# Patient Record
Sex: MALE | Race: WHITE | NOT HISPANIC OR LATINO | Employment: OTHER | URBAN - METROPOLITAN AREA
[De-identification: names, ages, dates, MRNs, and addresses within clinical notes are randomized per-mention and may not be internally consistent; named-entity substitution may affect disease eponyms.]

---

## 2019-04-29 ENCOUNTER — PREPPED CHART (OUTPATIENT)
Dept: URBAN - METROPOLITAN AREA CLINIC 32 | Facility: CLINIC | Age: 84
End: 2019-04-29

## 2020-01-13 ENCOUNTER — ESTABLISHED PATIENT (OUTPATIENT)
Dept: URBAN - METROPOLITAN AREA CLINIC 32 | Facility: CLINIC | Age: 85
End: 2020-01-13

## 2020-01-13 DIAGNOSIS — H35.3131: ICD-10-CM

## 2020-01-13 DIAGNOSIS — H35.373: ICD-10-CM

## 2020-01-13 PROCEDURE — 92134 CPTRZ OPH DX IMG PST SGM RTA: CPT

## 2020-01-13 PROCEDURE — 92014 COMPRE OPH EXAM EST PT 1/>: CPT

## 2020-01-13 ASSESSMENT — VISUAL ACUITY
OD_CC: 20/25
OS_CC: 20/60-2
OS_BAT: 20/400

## 2020-01-13 ASSESSMENT — TONOMETRY
OS_IOP_MMHG: 15
OD_IOP_MMHG: 13

## 2020-01-27 ENCOUNTER — SURGERY/PROCEDURE (OUTPATIENT)
Dept: URBAN - METROPOLITAN AREA CLINIC 32 | Facility: CLINIC | Age: 85
End: 2020-01-27

## 2020-01-27 DIAGNOSIS — H26.492: ICD-10-CM

## 2020-01-27 PROCEDURE — 66821 AFTER CATARACT LASER SURGERY: CPT

## 2020-02-10 ENCOUNTER — YAG POST-OP (OUTPATIENT)
Dept: URBAN - METROPOLITAN AREA CLINIC 32 | Facility: CLINIC | Age: 85
End: 2020-02-10

## 2020-02-10 DIAGNOSIS — Z98.890: ICD-10-CM

## 2020-02-10 PROCEDURE — 92015 DETERMINE REFRACTIVE STATE: CPT

## 2020-02-10 PROCEDURE — 99024 POSTOP FOLLOW-UP VISIT: CPT

## 2020-02-10 ASSESSMENT — TONOMETRY
OD_IOP_MMHG: 19
OS_IOP_MMHG: 19

## 2020-02-10 ASSESSMENT — VISUAL ACUITY
OS_CC: J1
OS_CC: 20/30-2
OD_CC: 20/25-3
OD_CC: J2

## 2021-01-18 ENCOUNTER — ESTABLISHED COMPREHENSIVE EXAM (OUTPATIENT)
Dept: URBAN - METROPOLITAN AREA CLINIC 32 | Facility: CLINIC | Age: 86
End: 2021-01-18

## 2021-01-18 DIAGNOSIS — H35.3131: ICD-10-CM

## 2021-01-18 DIAGNOSIS — H35.373: ICD-10-CM

## 2021-01-18 PROCEDURE — 92014 COMPRE OPH EXAM EST PT 1/>: CPT

## 2021-01-18 PROCEDURE — 92015 DETERMINE REFRACTIVE STATE: CPT

## 2021-01-18 PROCEDURE — 92134 CPTRZ OPH DX IMG PST SGM RTA: CPT

## 2021-01-18 ASSESSMENT — KERATOMETRY
OD_K1POWER_DIOPTERS: 44.00
OD_AXISANGLE_DEGREES: 147
OS_K1POWER_DIOPTERS: 44.00
OD_AXISANGLE2_DEGREES: 57
OS_K2POWER_DIOPTERS: 42.75
OS_AXISANGLE2_DEGREES: 72
OS_AXISANGLE_DEGREES: 162
OD_K2POWER_DIOPTERS: 42.75

## 2021-01-18 ASSESSMENT — VISUAL ACUITY
OS_CC: 20/50
OS_CC: J1
OD_CC: 20/30
OS_PH: 20/30
OD_CC: J1

## 2021-01-18 ASSESSMENT — TONOMETRY
OS_IOP_MMHG: 20
OD_IOP_MMHG: 17

## 2021-01-25 NOTE — PATIENT DISCUSSION
No retinal tears or retinal detachment seen on clinical exam today. Reviewed the signs and symptoms of retinal tear/retinal detachment and the importance of calling for prompt evaluation should there be increasing floaters, new flashing lights, or decreasing peripheral vision in either eye at any time. Observation recommended. normal...

## 2021-10-11 ENCOUNTER — FOLLOW UP (OUTPATIENT)
Dept: URBAN - METROPOLITAN AREA CLINIC 32 | Facility: CLINIC | Age: 86
End: 2021-10-11

## 2021-10-11 DIAGNOSIS — H35.3131: ICD-10-CM

## 2021-10-11 PROCEDURE — 92134 CPTRZ OPH DX IMG PST SGM RTA: CPT

## 2021-10-11 PROCEDURE — 92014 COMPRE OPH EXAM EST PT 1/>: CPT

## 2021-10-11 ASSESSMENT — TONOMETRY
OS_IOP_MMHG: 18
OD_IOP_MMHG: 14

## 2021-10-11 ASSESSMENT — VISUAL ACUITY
OS_CC: 20/50
OD_CC: 20/50

## 2021-10-18 ENCOUNTER — NEW PATIENT (OUTPATIENT)
Dept: URBAN - METROPOLITAN AREA CLINIC 33 | Facility: CLINIC | Age: 86
End: 2021-10-18

## 2021-10-18 VITALS
HEART RATE: 72 BPM | BODY MASS INDEX: 26.68 KG/M2 | DIASTOLIC BLOOD PRESSURE: 92 MMHG | WEIGHT: 197 LBS | SYSTOLIC BLOOD PRESSURE: 151 MMHG | HEIGHT: 72 IN

## 2021-10-18 DIAGNOSIS — H43.813: ICD-10-CM

## 2021-10-18 DIAGNOSIS — E11.9: ICD-10-CM

## 2021-10-18 DIAGNOSIS — H35.3132: ICD-10-CM

## 2021-10-18 DIAGNOSIS — H35.373: ICD-10-CM

## 2021-10-18 PROCEDURE — 92134 CPTRZ OPH DX IMG PST SGM RTA: CPT

## 2021-10-18 PROCEDURE — 92004 COMPRE OPH EXAM NEW PT 1/>: CPT

## 2021-10-18 ASSESSMENT — VISUAL ACUITY
OD_CC: 20/25+2
OS_CC: 20/25+2

## 2021-10-18 ASSESSMENT — TONOMETRY
OS_IOP_MMHG: 17
OD_IOP_MMHG: 16

## 2022-04-11 ENCOUNTER — COMPREHENSIVE EXAM (OUTPATIENT)
Dept: URBAN - METROPOLITAN AREA CLINIC 32 | Facility: CLINIC | Age: 87
End: 2022-04-11

## 2022-04-11 DIAGNOSIS — H35.3131: ICD-10-CM

## 2022-04-11 DIAGNOSIS — H35.373: ICD-10-CM

## 2022-04-11 PROCEDURE — 92134 CPTRZ OPH DX IMG PST SGM RTA: CPT

## 2022-04-11 PROCEDURE — 92015 DETERMINE REFRACTIVE STATE: CPT

## 2022-04-11 PROCEDURE — 99214 OFFICE O/P EST MOD 30 MIN: CPT

## 2022-04-11 ASSESSMENT — KERATOMETRY
OS_AXISANGLE_DEGREES: 160
OD_AXISANGLE2_DEGREES: 50
OS_K2POWER_DIOPTERS: 42.75
OS_K1POWER_DIOPTERS: 44.25
OD_K2POWER_DIOPTERS: 42.50
OD_K1POWER_DIOPTERS: 43.75
OS_AXISANGLE2_DEGREES: 70
OD_AXISANGLE_DEGREES: 140

## 2022-04-11 ASSESSMENT — TONOMETRY
OS_IOP_MMHG: 12
OD_IOP_MMHG: 12

## 2022-04-11 ASSESSMENT — VISUAL ACUITY
OD_CC: 20/40
OS_CC: 20/40

## 2023-01-16 ENCOUNTER — COMPREHENSIVE EXAM (OUTPATIENT)
Dept: URBAN - METROPOLITAN AREA CLINIC 33 | Facility: CLINIC | Age: 88
End: 2023-01-16

## 2023-01-16 DIAGNOSIS — H35.373: ICD-10-CM

## 2023-01-16 DIAGNOSIS — H35.3132: ICD-10-CM

## 2023-01-16 DIAGNOSIS — H43.813: ICD-10-CM

## 2023-01-16 DIAGNOSIS — E11.9: ICD-10-CM

## 2023-01-16 PROCEDURE — 92134 CPTRZ OPH DX IMG PST SGM RTA: CPT

## 2023-01-16 PROCEDURE — 92014 COMPRE OPH EXAM EST PT 1/>: CPT

## 2023-01-16 PROCEDURE — 92250 FUNDUS PHOTOGRAPHY W/I&R: CPT

## 2023-01-16 ASSESSMENT — TONOMETRY
OD_IOP_MMHG: 15
OS_IOP_MMHG: 18

## 2023-01-16 ASSESSMENT — VISUAL ACUITY
OS_CC: 20/40-2
OD_CC: 20/60-2

## 2023-04-10 ENCOUNTER — COMPREHENSIVE EXAM (OUTPATIENT)
Dept: URBAN - METROPOLITAN AREA CLINIC 32 | Facility: CLINIC | Age: 88
End: 2023-04-10

## 2023-04-10 DIAGNOSIS — H35.3121: ICD-10-CM

## 2023-04-10 DIAGNOSIS — H35.3211: ICD-10-CM

## 2023-04-10 DIAGNOSIS — E11.9: ICD-10-CM

## 2023-04-10 DIAGNOSIS — H43.813: ICD-10-CM

## 2023-04-10 DIAGNOSIS — H35.373: ICD-10-CM

## 2023-04-10 PROCEDURE — 92134 CPTRZ OPH DX IMG PST SGM RTA: CPT

## 2023-04-10 PROCEDURE — 99214 OFFICE O/P EST MOD 30 MIN: CPT

## 2023-04-10 PROCEDURE — 92015 DETERMINE REFRACTIVE STATE: CPT

## 2023-04-10 ASSESSMENT — KERATOMETRY
OS_AXISANGLE2_DEGREES: 78
OD_K2POWER_DIOPTERS: 42.50
OS_AXISANGLE_DEGREES: 168
OS_AXISANGLE2_DEGREES: 70
OS_K2POWER_DIOPTERS: 42.25
OD_K1POWER_DIOPTERS: 43.75
OS_K2POWER_DIOPTERS: 42.75
OD_K2POWER_DIOPTERS: 42.75
OS_K1POWER_DIOPTERS: 44.25
OD_K1POWER_DIOPTERS: 44.00
OD_AXISANGLE_DEGREES: 147
OS_AXISANGLE_DEGREES: 160
OD_AXISANGLE2_DEGREES: 50
OD_AXISANGLE_DEGREES: 140
OS_K1POWER_DIOPTERS: 44.00
OD_AXISANGLE2_DEGREES: 57

## 2023-04-10 ASSESSMENT — VISUAL ACUITY
OS_CC: 20/50
OD_SC: J7
OD_SC: 20/300
OS_SC: 20/80+1
OS_SC: J3
OD_CC: 20/200

## 2023-04-10 ASSESSMENT — TONOMETRY
OD_IOP_MMHG: 14
OS_IOP_MMHG: 20

## 2023-04-12 ENCOUNTER — FOLLOW UP (OUTPATIENT)
Dept: URBAN - METROPOLITAN AREA CLINIC 33 | Facility: CLINIC | Age: 88
End: 2023-04-12

## 2023-04-12 DIAGNOSIS — E11.9: ICD-10-CM

## 2023-04-12 DIAGNOSIS — H35.3132: ICD-10-CM

## 2023-04-12 DIAGNOSIS — H35.373: ICD-10-CM

## 2023-04-12 DIAGNOSIS — H43.813: ICD-10-CM

## 2023-04-12 PROCEDURE — 92014 COMPRE OPH EXAM EST PT 1/>: CPT

## 2023-04-12 PROCEDURE — 92250 FUNDUS PHOTOGRAPHY W/I&R: CPT

## 2023-04-12 PROCEDURE — 92134 CPTRZ OPH DX IMG PST SGM RTA: CPT

## 2023-04-12 ASSESSMENT — TONOMETRY
OD_IOP_MMHG: 14
OS_IOP_MMHG: 16

## 2023-04-12 ASSESSMENT — VISUAL ACUITY
OS_PH: 20/30-2
OS_CC: 20/40-1
OD_CC: 20/50-1

## 2023-11-15 ENCOUNTER — COMPREHENSIVE EXAM (OUTPATIENT)
Dept: URBAN - METROPOLITAN AREA CLINIC 33 | Facility: CLINIC | Age: 88
End: 2023-11-15

## 2023-11-15 DIAGNOSIS — H35.373: ICD-10-CM

## 2023-11-15 DIAGNOSIS — E11.9: ICD-10-CM

## 2023-11-15 DIAGNOSIS — H43.813: ICD-10-CM

## 2023-11-15 DIAGNOSIS — H35.3132: ICD-10-CM

## 2023-11-15 PROCEDURE — 92250 FUNDUS PHOTOGRAPHY W/I&R: CPT

## 2023-11-15 PROCEDURE — 92134 CPTRZ OPH DX IMG PST SGM RTA: CPT

## 2023-11-15 PROCEDURE — 92014 COMPRE OPH EXAM EST PT 1/>: CPT

## 2023-11-15 ASSESSMENT — TONOMETRY
OS_IOP_MMHG: 16
OD_IOP_MMHG: 13

## 2023-11-15 ASSESSMENT — VISUAL ACUITY
OD_CC: 20/60-2
OS_CC: 20/40-2

## 2024-05-20 ENCOUNTER — COMPREHENSIVE EXAM (OUTPATIENT)
Dept: URBAN - METROPOLITAN AREA CLINIC 32 | Facility: CLINIC | Age: 89
End: 2024-05-20

## 2024-05-20 DIAGNOSIS — H35.3121: ICD-10-CM

## 2024-05-20 DIAGNOSIS — H35.3211: ICD-10-CM

## 2024-05-20 DIAGNOSIS — H35.373: ICD-10-CM

## 2024-05-20 DIAGNOSIS — H43.813: ICD-10-CM

## 2024-05-20 DIAGNOSIS — E11.9: ICD-10-CM

## 2024-05-20 PROCEDURE — 99214 OFFICE O/P EST MOD 30 MIN: CPT

## 2024-05-20 PROCEDURE — 92015 DETERMINE REFRACTIVE STATE: CPT

## 2024-05-20 ASSESSMENT — VISUAL ACUITY
OS_SC: J3
OS_CC: J1-1
OS_CC: 20/30+1
OD_CC: 20/60-1
OD_SC: J10
OD_CC: J5+1

## 2024-05-20 ASSESSMENT — KERATOMETRY
OD_K2POWER_DIOPTERS: 42.75
OD_AXISANGLE_DEGREES: 133
OS_AXISANGLE_DEGREES: 159
OS_AXISANGLE2_DEGREES: 69
OD_AXISANGLE2_DEGREES: 43
OS_K1POWER_DIOPTERS: 44.5
OD_K1POWER_DIOPTERS: 44
OS_K2POWER_DIOPTERS: 43

## 2024-05-20 ASSESSMENT — TONOMETRY
OS_IOP_MMHG: 19
OD_IOP_MMHG: 18

## 2024-06-24 ENCOUNTER — FOLLOW UP (OUTPATIENT)
Dept: URBAN - METROPOLITAN AREA CLINIC 33 | Facility: CLINIC | Age: 89
End: 2024-06-24

## 2024-06-24 VITALS — BODY MASS INDEX: 25.87 KG/M2 | HEIGHT: 72 IN | WEIGHT: 191 LBS

## 2024-06-24 DIAGNOSIS — H35.373: ICD-10-CM

## 2024-06-24 DIAGNOSIS — H35.3132: ICD-10-CM

## 2024-06-24 DIAGNOSIS — H43.813: ICD-10-CM

## 2024-06-24 DIAGNOSIS — E11.9: ICD-10-CM

## 2024-06-24 DIAGNOSIS — H35.54: ICD-10-CM

## 2024-06-24 DIAGNOSIS — H04.123: ICD-10-CM

## 2024-06-24 PROCEDURE — 92014 COMPRE OPH EXAM EST PT 1/>: CPT

## 2024-06-24 PROCEDURE — 92250 FUNDUS PHOTOGRAPHY W/I&R: CPT | Mod: 59

## 2024-06-24 PROCEDURE — 92134 CPTRZ OPH DX IMG PST SGM RTA: CPT

## 2024-06-24 ASSESSMENT — VISUAL ACUITY
OS_CC: 20/50+1
OS_PH: 20/40-2
OD_CC: 20/80-1

## 2024-06-24 ASSESSMENT — TONOMETRY
OS_IOP_MMHG: 16
OD_IOP_MMHG: 12

## 2025-01-13 ENCOUNTER — FOLLOW UP (OUTPATIENT)
Age: OVER 89
End: 2025-01-13

## 2025-01-13 DIAGNOSIS — H43.813: ICD-10-CM

## 2025-01-13 DIAGNOSIS — Z96.1: ICD-10-CM

## 2025-01-13 DIAGNOSIS — H02.834: ICD-10-CM

## 2025-01-13 DIAGNOSIS — H35.61: ICD-10-CM

## 2025-01-13 DIAGNOSIS — H35.54: ICD-10-CM

## 2025-01-13 DIAGNOSIS — H35.373: ICD-10-CM

## 2025-01-13 DIAGNOSIS — H04.123: ICD-10-CM

## 2025-01-13 DIAGNOSIS — E11.9: ICD-10-CM

## 2025-01-13 DIAGNOSIS — H35.3132: ICD-10-CM

## 2025-01-13 DIAGNOSIS — D49.9: ICD-10-CM

## 2025-01-13 DIAGNOSIS — H02.831: ICD-10-CM

## 2025-01-13 PROCEDURE — 92250 FUNDUS PHOTOGRAPHY W/I&R: CPT | Mod: 59

## 2025-01-13 PROCEDURE — 92014 COMPRE OPH EXAM EST PT 1/>: CPT

## 2025-01-13 PROCEDURE — 92134 CPTRZ OPH DX IMG PST SGM RTA: CPT

## 2025-05-06 ENCOUNTER — COMPREHENSIVE EXAM (OUTPATIENT)
Age: OVER 89
End: 2025-05-06

## 2025-05-06 VITALS — HEIGHT: 72 IN | WEIGHT: 194 LBS | BODY MASS INDEX: 26.28 KG/M2

## 2025-05-06 DIAGNOSIS — H04.123: ICD-10-CM

## 2025-05-06 DIAGNOSIS — D49.9: ICD-10-CM

## 2025-05-06 DIAGNOSIS — H43.813: ICD-10-CM

## 2025-05-06 DIAGNOSIS — H35.54: ICD-10-CM

## 2025-05-06 DIAGNOSIS — H02.831: ICD-10-CM

## 2025-05-06 DIAGNOSIS — E11.9: ICD-10-CM

## 2025-05-06 DIAGNOSIS — H02.834: ICD-10-CM

## 2025-05-06 DIAGNOSIS — H35.61: ICD-10-CM

## 2025-05-06 DIAGNOSIS — H35.373: ICD-10-CM

## 2025-05-06 DIAGNOSIS — Z96.1: ICD-10-CM

## 2025-05-06 DIAGNOSIS — H35.3132: ICD-10-CM

## 2025-05-06 PROCEDURE — 92134 CPTRZ OPH DX IMG PST SGM RTA: CPT

## 2025-05-06 PROCEDURE — 99213 OFFICE O/P EST LOW 20 MIN: CPT

## 2025-05-06 PROCEDURE — 92250 FUNDUS PHOTOGRAPHY W/I&R: CPT
